# Patient Record
(demographics unavailable — no encounter records)

---

## 2024-11-26 NOTE — HISTORY OF PRESENT ILLNESS
[Home] : at home, [unfilled] , at the time of the visit. [Medical Office: ([facility])___] : at the medical office located in  [de-identified] : 60 year y.o female pt is here today for weigh loss management. Pt is in a good mood. Pt's current weight is 209 lbs .Pt is having trouble losing more weight through lifestyle medication. Pt inquired about weight loss medication for lowering current BMI 34. Pt started with Zepbound 2.5mg and will follow up in a month to co nakul sxs. Pt sxs are currently stable. No active complaints today. UTD with immunization.

## 2024-11-26 NOTE — PLAN
[FreeTextEntry1] : Pt is here for follow up on wieghtloss medication management. Pt's current weight is 209lb. Pt decreased 3 lbs since April visit through diet and exercise modifications. Pt feels ready to start Zepbound 2.5mg medication. Counseling provided for weight loss management post menopause. Pt understands R/B/A of treatments and agrees to undergo treatment with Zepbound 2.5mg for improvement in weight loss post menopause. Explained long term side effects on digestion, endocrine system, etc.. Pt provided reference to weight loss management  for further consultation. The patient was given an opportunity to ask questions and all questions were answered to their satisfaction.  Daily exercise aerobic 15 minutes per day, gradually increasing to at least 30 minutes per day. Recommended dietary changes including 90-100g of protein, aerobic exercise and emphasized strength training regimen at least 2x weekly to maintain healthy lean muscle mass and bone density.   RTO in 1 months for f.u.

## 2024-11-26 NOTE — ADDENDUM
[FreeTextEntry1] :    IJanetghar wrote this note acting as a scribe for Dr. Kathy Pepe MD on Nov 26, 2024 .   I, Dr. Kathy Pepe MD, ordering physician, have read and attest that all the information, medical decision making and discharge instructions within are true and accurate on 11/26/2024.